# Patient Record
Sex: FEMALE | Race: WHITE | HISPANIC OR LATINO | Employment: PART TIME | URBAN - METROPOLITAN AREA
[De-identification: names, ages, dates, MRNs, and addresses within clinical notes are randomized per-mention and may not be internally consistent; named-entity substitution may affect disease eponyms.]

---

## 2022-08-15 ENCOUNTER — OFFICE VISIT (OUTPATIENT)
Dept: DENTISTRY | Facility: CLINIC | Age: 22
End: 2022-08-15

## 2022-08-15 VITALS — TEMPERATURE: 97.8 F | SYSTOLIC BLOOD PRESSURE: 108 MMHG | HEART RATE: 75 BPM | DIASTOLIC BLOOD PRESSURE: 71 MMHG

## 2022-08-15 DIAGNOSIS — K08.89 PAIN, DENTAL: Primary | ICD-10-CM

## 2022-08-15 RX ORDER — AMOXICILLIN 500 MG/1
500 CAPSULE ORAL EVERY 8 HOURS SCHEDULED
Qty: 21 CAPSULE | Refills: 0 | Status: SHIPPED | OUTPATIENT
Start: 2022-08-15 | End: 2022-08-22

## 2022-08-15 NOTE — PROGRESS NOTES
Clarke Mederos came with c/c of pain in reln to "wisdom teeth" since 2 weeks  Pt was seen in the ER where she was given otc pain meds  Med hx revd: nsf noted, no allergies noted  ASA I  Pain level: 8  O/e:  E/o: wnl, bilateral facial symmetry  I/o:  #1,16 partially impacted, seen clin and on PAN,  gingiva inflamed in the area, v tender on palpation pt adv [facility] ref for ext #1,16  And prescribed antib, continue pain meds  Pt left the off comf, and ambulatory    Pt adv to return for exam after extrns     nv: comp exam

## 2024-08-14 ENCOUNTER — OFFICE VISIT (OUTPATIENT)
Dept: DENTISTRY | Facility: CLINIC | Age: 24
End: 2024-08-14

## 2024-08-14 VITALS — SYSTOLIC BLOOD PRESSURE: 106 MMHG | TEMPERATURE: 98.6 F | HEART RATE: 77 BPM | DIASTOLIC BLOOD PRESSURE: 69 MMHG

## 2024-08-14 DIAGNOSIS — Z01.20 ENCOUNTER FOR DENTAL EXAMINATION: Primary | ICD-10-CM

## 2024-08-14 PROCEDURE — D1330 ORAL HYGIENE INSTRUCTIONS: HCPCS | Performed by: DENTAL HYGIENIST

## 2024-08-14 PROCEDURE — D0150 COMPREHENSIVE ORAL EVALUATION - NEW OR ESTABLISHED PATIENT: HCPCS | Performed by: DENTIST

## 2024-08-14 PROCEDURE — D1110 PROPHYLAXIS - ADULT: HCPCS | Performed by: DENTAL HYGIENIST

## 2024-08-14 PROCEDURE — D0274 BITEWINGS - 4 RADIOGRAPHIC IMAGES: HCPCS | Performed by: DENTAL HYGIENIST

## 2024-08-14 NOTE — DENTAL PROCEDURE DETAILS
COMP EXAM, FMX, PROBE EXAM   REVIEWED MED HX: meds, allergies, health changes reviewed in EPIC  CHIEF CONCERN:     -Last dental visit was 8/15/22 for SABI;  Had 1 and 16 extracted at OS in 2022  PAIN SCALE:  0  ASA CLASS:  ASA 1 - Normal health patient  PLAQUE:  mild  CALCULUS: Light to moderate on lower anterior linguals  BLEEDING:  light  STAIN :  None  PERIO:   Slight gingivitis localized  ---FMP - 1-3 mm w/ localized BUP  ---Stage 1, Grade A    Visual and Tactile Intraoral/ Extraoral evaluation: Oral and Oropharyngeal cancer evaluation. No findings     Dr. Bagley -  Reviewed with patient clinical and radiographic findings and patient verbalized understanding. All questions and concerns addressed.     REFERRALS: Orthodontic referral provided  ---Crossbite and lower anterior spacing    CARIES FINDINGS:   Watch areas as charted       NEXT VISIT:   NV1:  6mrc - 50 min    Last BWs - 8/14/24  Last PAN - 8/15/22   Form signed and faxed. Copy made for chart.    Thank you,  Jordyn HOWE    [facility]